# Patient Record
(demographics unavailable — no encounter records)

---

## 2024-10-09 NOTE — HISTORY OF PRESENT ILLNESS
[FreeTextEntry1] : 21M recently presented to the ED w compaints of scrotal pain. On scrotal US found to have hyperemia of his left testis c/w epididymoorchitis. He was also found to be positive for chlamydia, started on doxycyline 100mg BID. He reports only being sexually active with 1 partner but it is an open relationship. He intermittently uses contraception.  Labs UCx - No growth Chlamydia - detected GC - not detected Trichomonas - not detected  Scrotal US RIGHT: Right testis: 3.6 cm x 2.2 cm x 2.3 cm. Normal echogenicity and echotexture with no masses or areas of architectural distortion. Hyperemia of the right testis. Right epididymis: Edematous and hyperemic. Right hydrocele: 4 cc simple hydrocele Right varicocele: None. LEFT: Left testis: 3.9 cm x 1.8 cm x 2.4 cm. Normal echogenicity and echotexture with no masses or areas of architectural distortion. Normal arterial and venous blood flow pattern. Left epididymis: Within normal limits. Left hydrocele: None. Left varicocele: None. IMPRESSION: Right testicular hyperemia, likely related to epididymoorchitis. Small associated hydrocele. The left testis is unremarkable.

## 2024-10-09 NOTE — PLAN

## 2024-10-09 NOTE — ASSESSMENT
[FreeTextEntry1] : #Epididymoorchitis 2/2 STI - Chlamydia w reactive hydrocele - scrotal US reviewed and interpreted. Left hyperemic flow to testicle with a reactive hydrocele, low volume for which no surgical intervention is indicated.  - Complete treatment w Doxycycline. Pt also informed partner who will also be tested/treated.  - Can repeat Scrotal US if sx persist/worsen to r/o testicular abscess - however at this time there is low suspicion - UCx - no growth - GC- negative - NSAIDs for pain - scrotal compression - Ice packs 15min on and off for swelling - Can follow up in 2-4 weeks if symptoms return